# Patient Record
(demographics unavailable — no encounter records)

---

## 2018-01-10 NOTE — PROGRESS NOTES
2016         RE: Wojciech Mathis                                 To: [facility]  MR#: [id number]                                   [address]   : [address]   ENC: [id number]:ROGER Ramirez, [address]   (Exam #: [id number])                           Fax: [phone]      The LMP of this 32year old,  1, para 0 patient was unknown, her   working PO is MAR 1 2016 and the current gestational age is 29 weeks 6   days by [address] [place] [address]  A sonographic examination was performed on 2016 using real time equipment  The ultrasound examination was   performed using abdominal technique  The patient has a BMI of 40 6  Her   blood pressure today was 126/61  Earliest ultrasound found in her record: 2015   13w2d  3/1/2016 PO            Cristal Carpenter has no complaints today  She reports regular fetal movements and   denies problems related to hypertension, gestational diabetes,    labor, or vaginal bleeding  Cardiac motion was observed at 155 bpm       INDICATIONS      morbid obesity      Exam Types      Level I      RESULTS      Fetus # 1 of 1   Vertex presentation   Fetal growth appeared normal   Placenta Location = Anterior   No placenta previa   Placenta Grade = II      MEASUREMENTS (* Included In Average GA)      OFD             11 1 cm   AC              28 3 cm        32 weeks 3 days* (24%)   BPD              8 2 cm        33 weeks 0 days* (29%)   HC              30 8 cm        34 weeks 0 days* (36%)   Femur            6 4 cm        32 weeks 6 days* (35%)      HC/AC           1 09   FL/AC           0 23   FL/BPD          0 78   EFW (Ac/Fl/Hc)  2068 grams - 4 lbs 9 oz                 (28%)      THE AVERAGE GESTATIONAL AGE is 33 weeks 1 day +/- 18 days        AMNIOTIC FLUID      Q1: 4 1      Q2: 4 1      Q3: 4 4      Q4: 3 6   MAKSIM Total = 16 2 cm   Amniotic Fluid: Normal BIOPHYSICAL PROFILE      The Biophysical Profile score was 8/8  Breathin  Movement: 2  Tone: 2  AFV: 2      FETAL VESSELS                                     S/D   PI    RI    PSV   AEDV RF                                                    cm/s       Umbilical Artery           2 69  0 93  0 63        No   No      IMPRESSION      [place] IUP   33 weeks and 1 day by this ultrasound  (PO=MAR 6 2016)   33 weeks and 6 days by 1st Tri Sono  (PO=MAR 1 2016)   Vertex presentation   Fetal growth appeared normal   Regular fetal heart rate of 155 bpm   Anterior placenta   No placenta previa      GENERAL COMMENT      No fetal structural abnormality is identified on the Level I survey today  The fetal brain, heart including four-chamber and outflow tract views,   stomach, kidneys, bladder, three vessel cord, diaphragm, and placental   cord insertion site appear normal   Fetal interval growth and amniotic   fluid volume are normal       Today's ultrasound findings and suggested follow-up were discussed in   detail with Kori  She will begin weekly nonstress testing in 2 weeks for   the indication of morbid obesity  Daily third trimester fetal kick   counting was discussed at the visit today  The face to face time, in addition to time spent discussing ultrasound   results, was 10 minutes, greater than 50% of which was spent during   counseling and coordination of care  NICHOLE Duncan S , R MIRZA LEYVA S  LIUDMILA Sanchez     Maternal-Fetal Medicine   Electronically signed 16 10:26

## 2018-01-12 NOTE — MISCELLANEOUS
Message   Recorded as Task   Date: 2015 05:48 PM, Created By: Allan Keen   Task Name: Go to Result   Assigned To: [facility],Team   Regarding Patient: Karly Contreras, Status: In Progress   Comment:    Denisse Coreas - 17 Dec 2015 5:48 PM     TASK CREATED  Patient had elevated GTT with fasting 94  Please call to inform her and refer to [facility]  Thanks!!   Tray Vicente - 18 Dec 2015 9:26 AM     TASK IN PROGRESS   Niecy Bertrand - 18 Dec 2015 9:27 AM     TASK REPLIED TO: Previously Assigned To [facility]  LMOM for pt to call back   Niecy Bertrand - 28 Dec 2015 12:05 PM     TASK REASSIGNED: Previously Assigned To [facility]  Pt discussed results with DR at prior apt  Please consult and advise  Thank you! Jodie Cottrell - 31 Dec 2015 9:15 AM     TASK REPLIED TO: Previously Assigned To  diabetic education,team  not able to reach pt  Niecy Bertrand - 31 Dec 2015 12:06 PM     TASK REPLIED TO: Previously Assigned To [facility]  Letter mailed to pt  Niecy Bertrand - 2016 8:21 AM     TASK REPLIED TO: Previously Assigned To Britt Mendoza  Per   ED, pt does need to be seen for GDM  Will continue to try to reach  Niecy Bertrand - 2016 3:32 PM     TASK REPLIED TO: Previously Assigned To [place] for pt to call office  Niecy Bertrand - 2016 8:56 AM     TASK REPLIED TO: Previously Assigned To [facility],Team  Have attempted to reach pt by phone many times over the course of 3 weeks  Sent letter to pts home  [facility] has been consulted and they have been unable to reach pt as well  Note put in prenatal chart for [facility] to discuss with pt in person at apt this week  Active Problems    1  Abnormal glucose in pregnancy, antepartum (648 83) (O99 810)   2  Anemia of pregnancy in second trimester (648 23,285 9) (O99 012)   3  Family historic risk of congenital abnormality (655 23) (O35 8XX0)   4   Need for prophylactic vaccination and inoculation against influenza (V04 81) (Z23)   5  Need for prophylactic vaccination with combined diphtheria-tetanus-pertussis (DTaP)   vaccine (V06 1) (Z23)   6  Obesity in pregnancy (649 10) (O99 210)   7  Occasional asthma with acute exacerbation (493 92) (J45 901)   8  Pregnancy, supervision of first (V22 0) (Z34 00)    Current Meds   1  CVS DHA Prenatal CAPS; TAKE 1 CAPSULE DAILY; Therapy: (Recorded:12Oct2015) to Recorded    Allergies    1  Penicillins    2  Animal dander   3  Eggs   4   Seasonal    Signatures   Electronically signed by : Ivanna Handley, ; Jan 11 2016  8:57AM EST                       (Author)

## 2018-01-13 NOTE — PROGRESS NOTES
2016         RE: Deloris [place]                                 To: [facility]  MR#: [id number]                                   [address]   : [address]   ENC: [id number]                             Jael Chi, [address]   (Exam #: [id number])                           Fax: [phone]      The LMP of this 32year old,  1, para 0 patient was unknown, her   working PO is MAR 1 2016 and the current gestational age is 42 weeks 2   days by 1st Trimester Sono  A sonographic examination was performed on 2016 using real time equipment  The ultrasound examination was   performed using abdominal technique  The patient has a BMI of 41 8  Her   blood pressure today was 110/69  Earliest ultrasound found in her record: 2015   13w2d  3/1/2016 PO            Cardiac motion was observed at 138 bpm       INDICATIONS      morbid obesity      Exam Types      Amniotic Fluid Index      RESULTS      Fetus # 1 of 1   Vertex presentation   Placenta Location = Anterior   No placenta previa   Placenta Grade = II      AMNIOTIC FLUID      Q1: 3 5      Q2: 4 6      Q3: 3 7      Q4: 4 1   MAKSIM Total = 15 7 cm   Amniotic Fluid: Normal      IMPRESSION      [place] IUP   37 weeks and 2 days by 1st Tri Sono  (PO=MAR 1 2016)   Vertex presentation   Regular fetal heart rate of 138 bpm   Anterior placenta   No placenta previa      GENERAL COMMENT      The patient presented today for antepartum fetal surveillance secondary to   morbid obesity  She had a modified biophysical profile, which includes an   NST and evaluation of the amniotic fluid  The NST was reactive and   reassuring  The amniotic fluid index was 15 7 cm, which is normal for   gestational age  Recommend continued weekly fetal testing secondary to morbid obesity  Thank very much for allowing us to participate in the care of your   patient    Should you have any questions, please do not hesitate to contact   our office  NICHOLE Moreno M D     Electronically signed 02/11/16 14:57

## 2018-01-13 NOTE — PROGRESS NOTES
Active Problems    1  Abnormal glucose in pregnancy, antepartum (648 83) (O99 810)   2  BMI 40 0-44 9, adult (V85 41) (Z68 41)   3  Maternal morbid obesity, antepartum, third trimester (649 13,278 01) (O99 213,E66 01)   4  Obesity in pregnancy (649 10) (O99 210)    Allergies    1  Penicillins    2  Animal dander   3  Eggs   4  Seasonal    Vitals  Signs [Data Includes: Current Encounter]   Recorded: [id number] 13:66CN   Systolic: 388, LUE, Sitting  Diastolic: 60, LUE, Sitting  BP Cuff Size: Large  Height: 5 ft 1 in  Weight: 221 lb 3 2 oz  BMI Calculated: 41 8  BSA Calculated: 1 97  Pain Scale: 0    Procedure    G/P 1/0   [facility] 2016   EGA 36 3/7   /60   Indication: morbid obesity  Duration of Test 39 minutes  Result: Reactive and > 15 bpm with movement  Baseline Rate 135 bpm    Deceleration: None  Uterine Activity: None  Required # Stimuli Response: Yes, 2 and Accel > 15 bpm    Fetal kick counts were reviewed with the patient  Recommend NST: weekly  Recommend MAKSIM: weekly  Current Meds   1  Breast Pump Miscellaneous; USE AS DIRECTED; Therapy: [id number] to Recorded   2  CVS DHA Prenatal CAPS; TAKE 1 CAPSULE DAILY; Therapy: (Recorded:2015) to Recorded   3  Iron Supplement 325 (65 Fe) MG Oral Tablet; TAKE 1 TABLET DAILY AS DIRECTED; Therapy: (Recorded:2016) to Recorded    Future Appointments    Date/Time Provider Specialty Site   2016 09:00 AM Gestational Diabetic, Schedule  [facility]   02/10/2016 08:40 AM Gabriele Carter [facility] Obstetrics/Gynecology [facility]   2016 08:30 AM  [place] [address]   2016 09:00 AM  [address]   2016 08:40 AM KISHOR Tejeda Obstetrics/Gynecology [facility]   2016 10:20 AM LIUDMILA Gaines   Obstetrics/Gynecology [facility] Signatures   Electronically signed by : Tim Adkins RN; Feb 4 2016 10:22AM EST                       (Author)    Electronically signed by : LINA Farooq ; Feb 4 2016  6:10PM EST                       (Author)

## 2018-01-15 NOTE — PROGRESS NOTES
2016         RE: [place]                                  To: [facility]  MR#: [id number]                                   [address]   : [address]   ENC: [id number]                             [place]   (Exam #: [id number])                           Fax: [phone]      The LMP of this 32year old,  1, para 0 patient was unknown, her   working PO is MAR 1 2016 and the current gestational age is 27 weeks 6   days by [address] [place] [address]  A sonographic examination was performed on 2016 using real time equipment  The ultrasound examination was performed   using abdominal technique  The patient has a BMI of 41 8  Her blood   pressure today was 126/60  Earliest ultrasound found in her record: 2015   13w2d  3/1/2016 PO            Cardiac motion was observed at 142 bpm       INDICATIONS      morbid obesity      Exam Types      Amniotic Fluid Index      RESULTS      Fetus # 1 of 1   Vertex presentation   Placenta Location = Posterior   No placenta previa   Placenta Grade = II      AMNIOTIC FLUID      Q1: 5 4      Q2: 4 5      Q3: 2 1      Q4: 4 5   MAKSIM Total = 16 5 cm   Amniotic Fluid: Normal      IMPRESSION      [place] IUP   35 weeks and 6 days by 1st Tri Sono  (PO=MAR 1 2016)   Vertex presentation   Regular fetal heart rate of 142 bpm   Posterior placenta   No placenta previa      GENERAL COMMENT      The patient presented today for antepartum fetal surveillance secondary to   morbid obesity  She had a modified biophysical profile, which includes an   NST and evaluation of the amniotic fluid  The NST was reactive and   reassuring  The amniotic fluid index was 16 5cm, which is normal for   gestational age  Recommend continued weekly fetal testing secondary to morbid obesity  Thank very much for allowing us to participate in the care of your   patient    Should you have any questions, please do not hesitate to contact   our office  NICHOLE Rowan M D     Electronically signed 02/01/16 15:41